# Patient Record
Sex: FEMALE | Race: WHITE | NOT HISPANIC OR LATINO
[De-identification: names, ages, dates, MRNs, and addresses within clinical notes are randomized per-mention and may not be internally consistent; named-entity substitution may affect disease eponyms.]

---

## 2017-12-04 ENCOUNTER — APPOINTMENT (OUTPATIENT)
Dept: NEUROSURGERY | Facility: CLINIC | Age: 52
End: 2017-12-04
Payer: COMMERCIAL

## 2017-12-04 DIAGNOSIS — Z82.49 FAMILY HISTORY OF ISCHEMIC HEART DISEASE AND OTHER DISEASES OF THE CIRCULATORY SYSTEM: ICD-10-CM

## 2017-12-04 DIAGNOSIS — H53.2 DIPLOPIA: ICD-10-CM

## 2017-12-04 DIAGNOSIS — H57.9 UNSPECIFIED DISORDER OF EYE AND ADNEXA: ICD-10-CM

## 2017-12-04 DIAGNOSIS — H53.8 OTHER VISUAL DISTURBANCES: ICD-10-CM

## 2017-12-04 PROCEDURE — 99205 OFFICE O/P NEW HI 60 MIN: CPT

## 2017-12-04 RX ORDER — LEVOTHYROXINE SODIUM 0.05 MG/1
50 TABLET ORAL
Qty: 30 | Refills: 0 | Status: DISCONTINUED | COMMUNITY
Start: 2017-03-28

## 2017-12-04 RX ORDER — LEVOTHYROXINE SODIUM 0.07 MG/1
75 TABLET ORAL
Qty: 30 | Refills: 0 | Status: DISCONTINUED | COMMUNITY
Start: 2017-08-31

## 2020-08-31 ENCOUNTER — APPOINTMENT (OUTPATIENT)
Dept: ENDOCRINOLOGY | Facility: CLINIC | Age: 55
End: 2020-08-31
Payer: COMMERCIAL

## 2020-08-31 VITALS
WEIGHT: 187 LBS | HEIGHT: 66 IN | SYSTOLIC BLOOD PRESSURE: 122 MMHG | DIASTOLIC BLOOD PRESSURE: 80 MMHG | BODY MASS INDEX: 30.05 KG/M2

## 2020-08-31 DIAGNOSIS — Z78.9 OTHER SPECIFIED HEALTH STATUS: ICD-10-CM

## 2020-08-31 PROCEDURE — 99203 OFFICE O/P NEW LOW 30 MIN: CPT

## 2020-08-31 RX ORDER — LEVOTHYROXINE SODIUM 100 UG/1
100 TABLET ORAL
Refills: 0 | Status: DISCONTINUED | COMMUNITY
End: 2020-08-31

## 2020-08-31 NOTE — PHYSICAL EXAM
[Alert] : alert [No Acute Distress] : no acute distress [Well Nourished] : well nourished [Normal Sclera/Conjunctiva] : normal sclera/conjunctiva [Well Developed] : well developed [No Proptosis] : no proptosis [EOMI] : extra ocular movement intact [Thyroid Not Enlarged] : the thyroid was not enlarged [Normal Oropharynx] : the oropharynx was normal [No Respiratory Distress] : no respiratory distress [No Thyroid Nodules] : no palpable thyroid nodules [No Accessory Muscle Use] : no accessory muscle use [Clear to Auscultation] : lungs were clear to auscultation bilaterally [Normal Rate] : heart rate was normal [Normal S1, S2] : normal S1 and S2 [Regular Rhythm] : with a regular rhythm [No Edema] : no peripheral edema [Pedal Pulses Normal] : the pedal pulses are present [Normal Bowel Sounds] : normal bowel sounds [Not Tender] : non-tender [Not Distended] : not distended [Soft] : abdomen soft [Normal Posterior Cervical Nodes] : no posterior cervical lymphadenopathy [Normal Anterior Cervical Nodes] : no anterior cervical lymphadenopathy [Spine Straight] : spine straight [No Stigmata of Cushings Syndrome] : no stigmata of Cushings Syndrome [Normal Gait] : normal gait [Normal Strength/Tone] : muscle strength and tone were normal [No Rash] : no rash [Oriented x3] : oriented to person, place, and time [No Tremors] : no tremors [Acanthosis Nigricans] : no acanthosis nigricans

## 2020-08-31 NOTE — HISTORY OF PRESENT ILLNESS
[FreeTextEntry1] : hypoT\par \par quality: hypoT \par onset 2008\par severity: mild\par modifying factors: lT4 helped \par associated factors: none \par

## 2021-01-07 ENCOUNTER — APPOINTMENT (OUTPATIENT)
Dept: ENDOCRINOLOGY | Facility: CLINIC | Age: 56
End: 2021-01-07

## 2021-02-20 ENCOUNTER — RX RENEWAL (OUTPATIENT)
Age: 56
End: 2021-02-20

## 2021-06-21 ENCOUNTER — RX RENEWAL (OUTPATIENT)
Age: 56
End: 2021-06-21

## 2021-06-21 RX ORDER — LEVOTHYROXINE SODIUM 0.07 MG/1
75 TABLET ORAL
Qty: 90 | Refills: 0 | Status: ACTIVE | COMMUNITY
Start: 2021-02-20 | End: 1900-01-01

## 2021-07-08 ENCOUNTER — RESULT REVIEW (OUTPATIENT)
Age: 56
End: 2021-07-08

## 2021-07-08 ENCOUNTER — APPOINTMENT (OUTPATIENT)
Dept: SURGERY | Facility: CLINIC | Age: 56
End: 2021-07-08
Payer: MEDICARE

## 2021-07-08 VITALS
BODY MASS INDEX: 30.05 KG/M2 | OXYGEN SATURATION: 97 % | RESPIRATION RATE: 18 BRPM | TEMPERATURE: 97.4 F | SYSTOLIC BLOOD PRESSURE: 116 MMHG | HEIGHT: 66 IN | WEIGHT: 187 LBS | HEART RATE: 68 BPM | DIASTOLIC BLOOD PRESSURE: 78 MMHG

## 2021-07-08 PROCEDURE — 99203 OFFICE O/P NEW LOW 30 MIN: CPT

## 2021-07-08 PROCEDURE — 99072 ADDL SUPL MATRL&STAF TM PHE: CPT

## 2021-07-08 NOTE — HISTORY OF PRESENT ILLNESS
[de-identified] : The patient comes to the office in consultation by Dr. Lisa Pagan for evaluation of a mass along the sternum.  The patient reports that she has noted the lump or swelling develop over the past 2 to 3 months.  The patient denies weight loss, states that she has gained weight.  She has no distant or recent trauma to the area.  She has mild discomfort in the general area, and reports that she had a Moh's resection of a basal cell from the general area 2 to 3 years ago.  She has no shortness of breath.

## 2021-07-08 NOTE — ASSESSMENT
[FreeTextEntry1] : The patient is a 56 year old female with prominence of the sternum.  The patient does not have a focal or discreet mass in the area of prominence.  I have recommended that we start with a plain x-ray of the sternum to assess the contour of the sternum.  She may, based on there results require additional imaging with either CT scan or MRI to further assess.  She will be advised once the x-ray is reviewed. A total of 40 minutes was spent coordinating the patient's care.

## 2021-07-08 NOTE — CONSULT LETTER
[Dear  ___] : Dear  [unfilled], [Consult Letter:] : I had the pleasure of evaluating your patient, [unfilled]. [Please see my note below.] : Please see my note below. [Consult Closing:] : Thank you very much for allowing me to participate in the care of this patient.  If you have any questions, please do not hesitate to contact me. [Sincerely,] : Sincerely, [FreeTextEntry3] : Khris Edgar MD, FACS\par  \par Department of Surgery\par [facility]\par [facility]\par

## 2021-07-08 NOTE — PHYSICAL EXAM
[JVD] : no jugular venous distention  [No Rash or Lesion] : No rash or lesion [Purpura] : no purpura  [Petechiae] : no petechiae [Skin Ulcer] : no ulcer [Skin Induration] : no induration [Alert] : alert [Oriented to Person] : oriented to person [Oriented to Place] : oriented to place [Oriented to Time] : oriented to time [de-identified] : non toxic, in no acute distress  [de-identified] : NC/AT PERRL EOMI no scleral icterus  [de-identified] : trachea is midline, no gross mass is seen  [de-identified] : no audible wheezing or stridor  [de-identified] : non distended  [de-identified] : FROM of all extremities with no gross deformity or angulation, there is a prominent area noted along the anterior chest wall overlying the area between the manubrium and body of the sternum, there is no discreet subcutaneous mass noted but fullness is appreciated of the underlying sternum [de-identified] : surgical scar noted from prior skin lesion excision adjacent to the area of prominence [de-identified] : mood is calm

## 2021-07-14 ENCOUNTER — OUTPATIENT (OUTPATIENT)
Dept: OUTPATIENT SERVICES | Facility: HOSPITAL | Age: 56
LOS: 1 days | End: 2021-07-14
Payer: COMMERCIAL

## 2021-07-14 ENCOUNTER — APPOINTMENT (OUTPATIENT)
Dept: RADIOLOGY | Facility: CLINIC | Age: 56
End: 2021-07-14
Payer: MEDICARE

## 2021-07-14 DIAGNOSIS — M89.8X8 OTHER SPECIFIED DISORDERS OF BONE, OTHER SITE: ICD-10-CM

## 2021-07-14 PROCEDURE — 71120 X-RAY EXAM BREASTBONE 2/>VWS: CPT

## 2021-07-14 PROCEDURE — 71120 X-RAY EXAM BREASTBONE 2/>VWS: CPT | Mod: 26

## 2021-07-22 ENCOUNTER — TRANSCRIPTION ENCOUNTER (OUTPATIENT)
Age: 56
End: 2021-07-22

## 2021-09-29 ENCOUNTER — OUTPATIENT (OUTPATIENT)
Dept: OUTPATIENT SERVICES | Facility: HOSPITAL | Age: 56
LOS: 1 days | End: 2021-09-29

## 2021-09-29 ENCOUNTER — APPOINTMENT (OUTPATIENT)
Dept: CT IMAGING | Facility: CLINIC | Age: 56
End: 2021-09-29
Payer: COMMERCIAL

## 2021-09-29 DIAGNOSIS — M89.8X8 OTHER SPECIFIED DISORDERS OF BONE, OTHER SITE: ICD-10-CM

## 2021-09-29 PROCEDURE — 71260 CT THORAX DX C+: CPT | Mod: 26

## 2021-10-04 ENCOUNTER — APPOINTMENT (OUTPATIENT)
Dept: SURGERY | Facility: CLINIC | Age: 56
End: 2021-10-04
Payer: COMMERCIAL

## 2021-10-04 VITALS
BODY MASS INDEX: 37.5 KG/M2 | HEIGHT: 60 IN | DIASTOLIC BLOOD PRESSURE: 74 MMHG | HEART RATE: 79 BPM | SYSTOLIC BLOOD PRESSURE: 118 MMHG | OXYGEN SATURATION: 96 % | WEIGHT: 191 LBS | TEMPERATURE: 96 F | RESPIRATION RATE: 16 BRPM

## 2021-10-04 DIAGNOSIS — M89.8X8 OTHER SPECIFIED DISORDERS OF BONE, OTHER SITE: ICD-10-CM

## 2021-10-04 PROCEDURE — 99213 OFFICE O/P EST LOW 20 MIN: CPT

## 2021-10-04 NOTE — PHYSICAL EXAM
[JVD] : no jugular venous distention  [No Rash or Lesion] : No rash or lesion [Purpura] : no purpura  [Petechiae] : no petechiae [Skin Ulcer] : no ulcer [Skin Induration] : no induration [Alert] : alert [Oriented to Person] : oriented to person [Oriented to Place] : oriented to place [Oriented to Time] : oriented to time [de-identified] : non toxic, in no acute distress  [de-identified] : NC/AT PERRL EOMI no scleral icterus  [de-identified] : trachea is midline, no gross mass is seen  [de-identified] : no audible wheezing or stridor  [de-identified] : non distended  [de-identified] : FROM of all extremities with no gross deformity or angulation, there is a prominent area noted along the anterior chest wall overlying the area between the manubrium and body of the sternum, there is no discreet subcutaneous mass noted but fullness is appreciated of the underlying sternum [de-identified] : surgical scar noted from prior skin lesion excision adjacent to the area of prominence, no discrete or focal mass is appreciated  [de-identified] : mood is calm

## 2021-10-04 NOTE — DATA REVIEWED
[FreeTextEntry1] : independent review of the chest CT scan reveals no mass in the area of concern, no boy abnormality is noted

## 2021-10-04 NOTE — HISTORY OF PRESENT ILLNESS
[de-identified] : The patient returns to the office with a persistent feeling of a mass in the mid sternal area.  She reports that with hot or warm water hitting the are it will tingled and burn.  She states that the overall size has remained unchanged.  She has no chest pain or shortness of breath.

## 2022-01-11 ENCOUNTER — APPOINTMENT (OUTPATIENT)
Dept: CARDIOLOGY | Facility: CLINIC | Age: 57
End: 2022-01-11
Payer: COMMERCIAL

## 2022-01-11 ENCOUNTER — NON-APPOINTMENT (OUTPATIENT)
Age: 57
End: 2022-01-11

## 2022-01-11 VITALS
SYSTOLIC BLOOD PRESSURE: 123 MMHG | OXYGEN SATURATION: 99 % | DIASTOLIC BLOOD PRESSURE: 85 MMHG | RESPIRATION RATE: 16 BRPM | BODY MASS INDEX: 30.37 KG/M2 | WEIGHT: 189 LBS | HEIGHT: 66 IN | HEART RATE: 70 BPM

## 2022-01-11 DIAGNOSIS — Z82.49 FAMILY HISTORY OF ISCHEMIC HEART DISEASE AND OTHER DISEASES OF THE CIRCULATORY SYSTEM: ICD-10-CM

## 2022-01-11 DIAGNOSIS — R42 DIZZINESS AND GIDDINESS: ICD-10-CM

## 2022-01-11 DIAGNOSIS — Z72.89 OTHER PROBLEMS RELATED TO LIFESTYLE: ICD-10-CM

## 2022-01-11 DIAGNOSIS — Z82.3 FAMILY HISTORY OF STROKE: ICD-10-CM

## 2022-01-11 PROCEDURE — 99204 OFFICE O/P NEW MOD 45 MIN: CPT

## 2022-01-11 PROCEDURE — 93000 ELECTROCARDIOGRAM COMPLETE: CPT

## 2022-01-11 NOTE — HISTORY OF PRESENT ILLNESS
[FreeTextEntry1] : Patient presents to the office today for evaluation of recent palpitations.  She notes that it feels like a fluttering in her chest and a "hesitation" in her heartbeat.  It is associated with some mild shortness of breath at times.  This seems to occur at random but always at rest.  She never experienced it with exertion.  It can go off and on for about a minute or 2 and then goes away for a while.  She was having it a couple times a week but over the last few days it has not really happened.  Separately from that she has noticed that over the last few months she has gotten a head rush more often upon standing.  This occurs after she has been sitting for an extended period of time.  It last for a few seconds and then resolves.  She did have symptoms of possible COVID-19 a couple of weeks ago but did not have any testing done.  Those symptoms seem to have improved including loss of taste and smell.  Patient denies chest pain, orthopnea, presyncope, syncope.

## 2022-01-11 NOTE — ASSESSMENT
[FreeTextEntry1] : EKG: Sinus rhythm with borderline T wave abnormalities.\par \par 56-year-old female with a past medical history of hypothyroidism who presents to me for evaluation of palpitations.  The description of the palpitations certainly is more consistent with extrasystolic beating or possibly related to stress and anxiety rather than more significant arrhythmia.  However I will do additional testing to try and rule this out.  Her EKG is mildly abnormal and I will evaluate this as well.  Her blood pressure appears to be controlled.  She reports having had recent blood work and I will get a copy to evaluate especially her lipids.

## 2022-01-11 NOTE — DISCUSSION/SUMMARY
[FreeTextEntry1] : 1.  Check 2-week event monitor to further evaluate her palpitations.\par 2.  Check echocardiogram and exercise stress test to further evaluate her palpitations and her mildly abnormal EKG.\par 3.  No additional cardiac medications at this time.\par 4.  Patient encouraged  to find ways to reduce stress, such as meditation and/or yoga.\par 5.  Patient encouraged to work on diet to lose weight.\par 6.  Follow-up with her primary for treatment of hypothyroidism.\par 7.  Follow up here after testing, and will make further recommendations at that time.

## 2022-01-19 ENCOUNTER — APPOINTMENT (OUTPATIENT)
Dept: CARDIOLOGY | Facility: CLINIC | Age: 57
End: 2022-01-19
Payer: COMMERCIAL

## 2022-01-19 PROCEDURE — 93015 CV STRESS TEST SUPVJ I&R: CPT

## 2022-01-19 PROCEDURE — 93306 TTE W/DOPPLER COMPLETE: CPT

## 2022-02-03 ENCOUNTER — APPOINTMENT (OUTPATIENT)
Dept: CARDIOLOGY | Facility: CLINIC | Age: 57
End: 2022-02-03

## 2022-02-09 ENCOUNTER — NON-APPOINTMENT (OUTPATIENT)
Age: 57
End: 2022-02-09

## 2022-02-16 ENCOUNTER — APPOINTMENT (OUTPATIENT)
Dept: CARDIOLOGY | Facility: CLINIC | Age: 57
End: 2022-02-16
Payer: COMMERCIAL

## 2022-02-16 DIAGNOSIS — R94.31 ABNORMAL ELECTROCARDIOGRAM [ECG] [EKG]: ICD-10-CM

## 2022-02-16 PROCEDURE — 78452 HT MUSCLE IMAGE SPECT MULT: CPT

## 2022-02-16 PROCEDURE — 93015 CV STRESS TEST SUPVJ I&R: CPT

## 2022-02-16 PROCEDURE — A9500: CPT

## 2022-03-03 ENCOUNTER — APPOINTMENT (OUTPATIENT)
Dept: CARDIOLOGY | Facility: CLINIC | Age: 57
End: 2022-03-03
Payer: COMMERCIAL

## 2022-03-03 VITALS
SYSTOLIC BLOOD PRESSURE: 112 MMHG | DIASTOLIC BLOOD PRESSURE: 70 MMHG | BODY MASS INDEX: 30.22 KG/M2 | WEIGHT: 188 LBS | OXYGEN SATURATION: 98 % | HEIGHT: 66 IN | HEART RATE: 65 BPM | RESPIRATION RATE: 16 BRPM

## 2022-03-03 DIAGNOSIS — R00.2 PALPITATIONS: ICD-10-CM

## 2022-03-03 DIAGNOSIS — E66.9 OBESITY, UNSPECIFIED: ICD-10-CM

## 2022-03-03 DIAGNOSIS — E03.9 HYPOTHYROIDISM, UNSPECIFIED: ICD-10-CM

## 2022-03-03 PROCEDURE — 99213 OFFICE O/P EST LOW 20 MIN: CPT

## 2022-03-03 RX ORDER — AMOXICILLIN AND CLAVULANATE POTASSIUM 500; 125 MG/1; MG/1
500-125 TABLET, FILM COATED ORAL
Qty: 21 | Refills: 0 | Status: DISCONTINUED | COMMUNITY
Start: 2021-12-30 | End: 2022-03-03

## 2022-03-03 NOTE — HISTORY OF PRESENT ILLNESS
[FreeTextEntry1] : Patient returns to the office today still having palpitations off and on.  She says they continue to feel like a slowing in her heart at times that lasts for a second or 2 and then resolved.  No associated symptoms at all.  No dizziness or lightheadedness.  No other new symptoms at this time.  Patient denies chest pain, shortness of breath, orthopnea, presyncope, syncope.
abnormal uterine and vaginal bleeding

## 2022-03-03 NOTE — DISCUSSION/SUMMARY
[FreeTextEntry1] : 1.  No additional cardiac testing at this time.\par 2.  No additional cardiac medications at this time.\par 3.  Patient encouraged  to find ways to reduce stress, such as meditation and/or yoga.\par 4.  Patient encouraged to work on diet to lose weight.\par 5.  Encouraged her to cut down on caffeine and alcohol.\par 6.  Follow-up with her primary and endocrinologist for treatment of hypothyroidism.\par 7.  There is no need for routine cardiac follow-up at this time.  I will certainly always be available as needed if there are any additional questions or concerns.

## 2022-03-03 NOTE — ASSESSMENT
[FreeTextEntry1] : Echocardiogram January 19, 2022 demonstrated left ventricle normal size and function with ejection fraction of 60 to 65%.  Trace mitral and aortic regurgitation noted.  No other significant structural abnormalities noted.\par \par Event monitor January 25 to December 7, 2022 demonstrated presenting rhythm was sinus with an average heart rate of 71 bpm, maximum 145, minimum 50 bpm.  Occasional PACs and PVCs were noted.  No other significant arrhythmia.  Patient did have a couple of episodes of palpitations which were associated with sinus rhythm and occasional PVCs.\par \par Exercise stress test performed January 19, 2022 during which the patient exercised via a [employer] protocol for 9 minutes, totaling 10 METS.  Peak heart rate achieved was 153 bpm, representing 93% of age-predicted maximum.  Blood pressure response was normal.  EKG demonstrated horizontal ST depressions in the inferior leads with exercise concerning for ischemia.\par \par Given the abnormal exercise stress test, a nuclear stress test was performed February 16, 2022 during which the patient exercised via a [employer] protocol for 9 minutes, totaling 10 METS.  Peak heart rate achieved was 153 bpm, representing 93% of age-predicted maximum.  Blood pressure response was normal.  EKG demonstrated no ischemia with exercise on this study.  Evaluation of nuclear imaging showed no evidence of ischemia or infarct and an ejection fraction of 66%.\par \par 56-year-old female with a past medical history of hypothyroidism who presented to me for evaluation of palpitations.  The description of her palpitations was more consistent with extrasystolic beating and this is what was found on her event monitor.  No other significant arrhythmia but she did have some PVCs and PACs.  Echocardiogram shows a normal EF and no significant structural abnormalities.  Stress testing ultimately showed no evidence of ischemia or infarct and a normal ejection fraction.  There appears to be no significant cardiac issues.  Her palpitations may also be related to stress and anxiety.  She is concerned that it could also be related to her Synthroid and hypothyroidism.  Her blood work would appear to show good control of this condition but follow-up with her endocrinologist as recommended.  No need for additional cardiac work-up or treatment at this time.

## 2022-03-04 DIAGNOSIS — Z00.00 ENCOUNTER FOR GENERAL ADULT MEDICAL EXAMINATION W/OUT ABNORMAL FINDINGS: ICD-10-CM

## 2022-05-18 PROBLEM — R94.31 ABNORMAL ECG: Status: ACTIVE | Noted: 2022-05-18

## 2022-05-18 RX ORDER — KIT FOR THE PREPARATION OF TECHNETIUM TC99M SESTAMIBI 1 MG/5ML
INJECTION, POWDER, LYOPHILIZED, FOR SOLUTION PARENTERAL
Refills: 0 | Status: COMPLETED | OUTPATIENT
Start: 2022-05-18

## 2022-05-18 RX ADMIN — KIT FOR THE PREPARATION OF TECHNETIUM TC99M SESTAMIBI 0: 1 INJECTION, POWDER, LYOPHILIZED, FOR SOLUTION PARENTERAL at 00:00

## 2023-03-29 ENCOUNTER — NON-APPOINTMENT (OUTPATIENT)
Age: 58
End: 2023-03-29

## 2023-04-03 ENCOUNTER — APPOINTMENT (OUTPATIENT)
Dept: OBGYN | Facility: CLINIC | Age: 58
End: 2023-04-03

## 2023-04-04 ENCOUNTER — APPOINTMENT (OUTPATIENT)
Dept: GYNECOLOGIC ONCOLOGY | Facility: CLINIC | Age: 58
End: 2023-04-04

## 2023-04-27 NOTE — CHIEF COMPLAINT
[FreeTextEntry1] : No SHOW 4/4/23\par \par 56 y/o referred from Dr. Cecelia Mccarthy for evaluation of vulvar lesion.\par \par \par LMP:\par OBHX:\par GYNHX: \par \par PMHX:\par SX:\par \par MED:\par ALL:\par \par SOCIAL:\par FAMHX:\par \par Health Maintenance\par Last pap:\par Last mammogram:\par Last colonoscopy:\par

## 2023-05-07 NOTE — ASSESSMENT
[FreeTextEntry1] : The patient is a 56 year old female with a concern about a mass over the mid sternal area.  The patient has no focal mass on exam nor on CT scan imaging.  The patient at this point will follow up as needed should any problems or issues arise. A total of 20 minutes was spent coordinating the patient's care.  Yes

## 2023-05-24 ENCOUNTER — NON-APPOINTMENT (OUTPATIENT)
Age: 58
End: 2023-05-24